# Patient Record
Sex: FEMALE | URBAN - METROPOLITAN AREA
[De-identification: names, ages, dates, MRNs, and addresses within clinical notes are randomized per-mention and may not be internally consistent; named-entity substitution may affect disease eponyms.]

---
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
## 2019-02-01 ENCOUNTER — NURSE TRIAGE (OUTPATIENT)
Dept: NURSING | Facility: CLINIC | Age: 19
End: 2019-02-01

## 2019-02-02 NOTE — TELEPHONE ENCOUNTER
"Patient states she feels like she has a lump in her throat, she doesn't feel like it is quite a sore throat but is unsure how else she can describe it. Denies eating or drinking anything new today. States she has her normal allergies that she took her allergy pill for and felt that helped a little. Denies breathing, hoarseness or swallowing issues. States she has been drinking a lot of fluids this evening. Denies fever, earache or rash. Patient is able ot open her mouth up completely with no issues. Her roommate looked at her throat and did not see anything abnormal. Denies diabetes or hx of rheumatic fever, and does not believe she has been by anyone with strep throat.     Protocol and home care advice reviewed.  Patient still felt uncertain and may go in to have it looked at to make sure she isn't having an allergic reaction.  Caller states understanding of the recommended disposition.   Advised to call back if further questions or concerns.    Additional Information    Negative: Severe difficulty breathing (e.g., struggling for each breath, speaks in single words, stridor)    Negative: Sounds like a life-threatening emergency to the triager    Negative: [1] Drooling or spitting out saliva (because can't swallow) AND [2] normal breathing    Negative: Unable to open mouth completely    Negative: [1] Difficulty breathing AND [2] not severe    Negative: Fever > 104 F (40 C)    Negative: [1] Refuses to drink anything AND [2] for > 12 hours    Negative: [1] Drinking very little AND [2] dehydration suspected (e.g., no urine > 12 hours, very dry mouth, very lightheaded)    Negative: Patient sounds very sick or weak to the triager    Negative: SEVERE (e.g., excruciating) throat pain    Negative: [1] Pus on tonsils (back of throat) AND [2]  fever AND [3] swollen neck lymph nodes (\"glands\")    Negative: [1] Rash AND [2] widespread (especially chest and abdomen)    Negative: Earache also present    Negative: Fever present > " 3 days (72 hours)    Negative: Diabetes mellitus or weak immune system (e.g., HIV positive, cancer chemo, splenectomy, organ transplant)    Negative: History of rheumatic fever    Negative: [1] Adult is leaving on a trip AND [2] requests an antibiotic NOW    Negative: [1] Positive throat culture or rapid strep test (according to lab, PCP, caller, etc.) AND [2] NO  standing order to call in prescription for antibiotic    Negative: [1] Exposure to family member (or spouse or boyfriend/girlfriend) with test-proven strep AND [2] within last 10 days    Negative: [1] Sore throat is the only symptom AND [2] present > 48 hours    Negative: [1] Sore throat with cough/cold symptoms AND [2] present > 5 days    [1] Sore throat is the only symptom AND [2] sore throat present < 48 hours  (all triage questions negative)    Protocols used: SORE THROAT-ADULT-

## 2019-04-26 ENCOUNTER — NURSE TRIAGE (OUTPATIENT)
Dept: NURSING | Facility: CLINIC | Age: 19
End: 2019-04-26

## 2019-04-26 NOTE — TELEPHONE ENCOUNTER
Pt states the nurse from [facility] called her and told her to call the nurse information line, no further details.  Pt reports she was seen at [facility] 2 weeks ago for indigestion and was prescribed Omeprazole.  She denies having new or worsening symptoms, states she has an appt on Tuesday.      Writer advised her to call [facility] on Monday morning and to call [facility] back if she needs assistance over the weekend for new/worsneing symptoms.  She verbalized understanding and had no further questions.     Carole Roman RN/GIDEON

## 2019-05-03 ENCOUNTER — ANCILLARY PROCEDURE (OUTPATIENT)
Dept: ULTRASOUND IMAGING | Facility: CLINIC | Age: 19
End: 2019-05-03
Payer: COMMERCIAL

## 2019-05-03 DIAGNOSIS — R10.11 POSTPRANDIAL RUQ PAIN: ICD-10-CM

## 2019-05-03 DIAGNOSIS — K21.00 GASTROESOPHAGEAL REFLUX DISEASE WITH ESOPHAGITIS: ICD-10-CM

## 2024-09-04 NOTE — TELEPHONE ENCOUNTER
Additional Information    Negative: Lab calling with strep throat test results and triager can call in prescription    Negative: Lab calling with urinalysis test results and triager can call in prescription    Negative: Medication questions    Negative: ED call to PCP    Negative: Physician call to PCP    Negative: Call about patient who is currently hospitalized    Negative: Lab or radiology calling with CRITICAL test results    Negative: [1] Prescription not at pharmacy AND [2] was prescribed today by PCP    Negative: [1] Follow-up call from patient regarding patient's clinical status AND [2] information urgent    Negative: [1] Caller requests to speak ONLY to PCP AND [2] URGENT question    Negative: [1] Caller requests to speak to PCP now AND [2] won't tell us reason for call  (Exception: if 10 pm to 6 am, caller must first discuss reason for the call)    Negative: Notification of hospital admission    Negative: Notification of death    Negative: Caller requesting lab results    Negative: Lab or radiology calling with test results    Negative: [1] Follow-up call from patient regarding patient's clinical status AND [2] information NON-URGENT    Negative: [1] Caller requests to speak ONLY to PCP AND [2] NON-URGENT question    Negative: Caller requesting an appointment, triage offered and declined    [1] Other NON-URGENT information for PCP AND [2] does not require PCP response    Protocols used: [facility]     VTE Assessment already completed for this visit

## 2024-11-07 NOTE — TELEPHONE ENCOUNTER
"Call Type: Triage Call    Presenting Problem: Patient had a ear \"cartilage piercing\" and now is  having \"brown drainage from ear.\" Triaged for body piercing  questions (pediatric)/Disposition to provider home/self care.  Triage Note:  Guideline Title: Body Piercing Questions (Pediatric)  Recommended Disposition: Provide Home/Self Care  Original Inclination: Wanted to speak with a nurse  Override Disposition:  Intended Action: Follow Selfcare / Homecare  Physician Contacted: No  Minor local infection (e.g., slight localized redness, minor discharge) (all  triage questions negative) ?  YES  Child sounds very sick or weak to the triager ? NO  [1] Bleeding at piercing site (e.g., after minor injury) AND [2] won't stop after  10 minutes of direct pressure (using correct technique) ? NO  [1] Large thick scar at body piercing site AND present > 2 months ? NO  [1] Looks infected (spreading redness, red streak, pus) AND [2] fever ? NO  [1] Minor local infection (e.g., localized redness, minor discharge) AND [2] not  improved after 3 days of following CARE ADVICE including antibiotic ointment ? NO  [1] Pierced tongue becomes very swollen and painful AND [2] causes drooling or  difficulty breathing ? NO  [1] Red streak runs from the wound AND [2] longer than 1 inch (2.5 cm) ? NO  [1] Redness has spread beyond the piercing site AND [2] no fever ? NO  [1] Skin around the piercing site has become red AND [2] larger than 2 inches (5  cm) ? NO  Part of jewelry (e.g., clasp) is stuck inside the skin ? NO  Pierced tongue becomes very swollen and painful ? NO  Piercing jewelry tore completely through lip or nostril ? NO  Skin is split open or gaping (if unsure, refer in if cut length > 1/4 inch or 6 mm  on the face; length > 1/2 inch or 12 mm elsewhere) ? NO  Ear piercing questions ? NO  Physician Instructions:  Care Advice: HOME CARE: You should be able to treat this at home.  CARE ADVICE given per Body Piercing (Pediatric) " Chief Complaint   Patient presents with    Blood Draw     Labs drawn with  by RN. Vitals taken. Pt checked into next appointment.       Mary Bradley RN     guideline.  ANTIBIOTIC OINTMENT: * Apply a small amount of antibiotic ointment to  piercing site 3 times per day after each saline soak. * Use Bacitracin  ointment or Polysporin ointment or one that you already have at home.  CALL BACK IF: * Not improved after 3 days * Becomes more painful *  Spreading redness occurs * Your teen becomes worse  CLEANING INSTRUCTIONS: * Soak the area in warm saline solution 3 times per  day for 5-10 minutes. * For certain body areas, you can take a cup of warm  saline, quickly turn it upside down over the piercing, and press it firmly  against the skin to form a seal. For other body areas, it is easier to  place a saline-soaked cotton ball directly on the piercing. * Wash the  piercing site with a mild liquid soap once a day to get off any crusts. Use  just a tiny amount of soap and be certain to rinse it off completely.  EXPECTED COURSE: * With proper care, most minor piercing site infections  should clear up in a couple days.  REASSURANCE AND EDUCATION: * This sounds like a minor infection that you  can treat at home. (Note: In new piercings, it is sometimes difficult to  tell between a normally healing piercing and a slightly infected one.) *  The most important thing to do is to keep the piercing clean. * It's also  important to apply an antibiotic ointment. * You should NOT take out the  jewelry.